# Patient Record
Sex: MALE | Race: WHITE | NOT HISPANIC OR LATINO | Employment: FULL TIME | URBAN - METROPOLITAN AREA
[De-identification: names, ages, dates, MRNs, and addresses within clinical notes are randomized per-mention and may not be internally consistent; named-entity substitution may affect disease eponyms.]

---

## 2021-03-09 ENCOUNTER — IMMUNIZATION (OUTPATIENT)
Dept: VACCINE CLINIC | Facility: HOSPITAL | Age: 21
End: 2021-03-09

## 2021-03-09 PROCEDURE — 0001A: CPT | Performed by: INTERNAL MEDICINE

## 2021-03-09 PROCEDURE — 91300 HC SARSCOV02 VAC 30MCG/0.3ML IM: CPT | Performed by: INTERNAL MEDICINE

## 2021-03-30 ENCOUNTER — IMMUNIZATION (OUTPATIENT)
Dept: VACCINE CLINIC | Facility: HOSPITAL | Age: 21
End: 2021-03-30

## 2021-03-30 PROCEDURE — 91300 HC SARSCOV02 VAC 30MCG/0.3ML IM: CPT | Performed by: INTERNAL MEDICINE

## 2021-03-30 PROCEDURE — 0002A: CPT | Performed by: INTERNAL MEDICINE

## 2021-10-29 ENCOUNTER — OFFICE VISIT (OUTPATIENT)
Dept: FAMILY MEDICINE CLINIC | Facility: CLINIC | Age: 21
End: 2021-10-29

## 2021-10-29 VITALS
OXYGEN SATURATION: 97 % | HEIGHT: 72 IN | RESPIRATION RATE: 20 BRPM | WEIGHT: 258 LBS | HEART RATE: 86 BPM | BODY MASS INDEX: 34.95 KG/M2 | TEMPERATURE: 99.4 F | DIASTOLIC BLOOD PRESSURE: 64 MMHG | SYSTOLIC BLOOD PRESSURE: 106 MMHG

## 2021-10-29 DIAGNOSIS — R10.11 COLICKY RUQ ABDOMINAL PAIN: Primary | ICD-10-CM

## 2021-10-29 DIAGNOSIS — K21.9 GASTROESOPHAGEAL REFLUX DISEASE, UNSPECIFIED WHETHER ESOPHAGITIS PRESENT: ICD-10-CM

## 2021-10-29 DIAGNOSIS — Z23 NEED FOR INFLUENZA VACCINATION: ICD-10-CM

## 2021-10-29 PROCEDURE — 90471 IMMUNIZATION ADMIN: CPT | Performed by: NURSE PRACTITIONER

## 2021-10-29 PROCEDURE — 90686 IIV4 VACC NO PRSV 0.5 ML IM: CPT | Performed by: NURSE PRACTITIONER

## 2021-10-29 PROCEDURE — 99203 OFFICE O/P NEW LOW 30 MIN: CPT | Performed by: NURSE PRACTITIONER

## 2021-10-29 RX ORDER — PANTOPRAZOLE SODIUM 40 MG/1
40 TABLET, DELAYED RELEASE ORAL DAILY
Qty: 30 TABLET | Refills: 0 | Status: SHIPPED | OUTPATIENT
Start: 2021-10-29

## 2021-10-29 NOTE — PROGRESS NOTES
"Chief Complaint  Establish Care (Hasn't had PCP ever, always went to [facility] etc ), Vomiting bile after supper (x 2-3wks), and Flu Vaccine    Subjective          Moses Hairston presents to [facility]  History of Present Illness  NP to establish care  Nauseated and vomiting off and on for the past 3 weeks  Still has gallbladder.  Vomiting bile after eating in the evening, some belching and burping and RUQ abdominal discomfort   Taking a lot of TUMs to help with stomach. Working a lot of over time, under some stress.   Fatty or fried or heavy foods make symptoms worse  Using tobacco dip and cigarettes, alcohol does not seem to make it worse.  Working [facility] [employer] day shift 6 am- 4:30 pm     Objective   Vital Signs:   /64   Pulse 86   Temp 99.4 °F (37.4 °C) Comment: wearing a hat  Resp 20   Ht 182.9 cm (72\")   Wt 117 kg (258 lb)   SpO2 97%   BMI 34.99 kg/m²     Physical Exam  Vitals and nursing note reviewed.   Constitutional:       General: He is not in acute distress.     Appearance: Normal appearance. He is well-developed. He is not diaphoretic.   HENT:      Head: Normocephalic.      Nose: Nose normal.   Cardiovascular:      Rate and Rhythm: Normal rate and regular rhythm.      Heart sounds: Normal heart sounds.   Pulmonary:      Effort: Pulmonary effort is normal.      Breath sounds: Normal breath sounds.   Abdominal:      General: Bowel sounds are normal. There is no distension.      Palpations: Abdomen is soft. There is no mass.      Tenderness: There is no abdominal tenderness. There is no guarding or rebound.      Hernia: No hernia is present.   Skin:     General: Skin is warm and dry.   Neurological:      General: No focal deficit present.      Mental Status: He is alert and oriented to person, place, and time.   Psychiatric:         Behavior: Behavior normal.         Thought Content: Thought content normal.         Judgment: Judgment normal.        Result " Review :                 Assessment and Plan    Diagnoses and all orders for this visit:    1. Colicky RUQ abdominal pain (Primary)  -     US Gallbladder; Future    2. Gastroesophageal reflux disease, unspecified whether esophagitis present  -     pantoprazole (PROTONIX) 40 MG EC tablet; Take 1 tablet by mouth Daily.  Dispense: 30 tablet; Refill: 0  -     US Gallbladder; Future    3. Need for influenza vaccination  -     FluLaval/Fluarix/Fluzone >6 Months ([id number])        Follow Up   No follow-ups on file.  Patient was given instructions and counseling regarding his condition or for health maintenance advice. Please see specific information pulled into the AVS if appropriate.   Will start pt on Pantoprazole and check US of gall bladder if no stones or inflammation will send for HIDA scan to check gallbladder function.   Discussed options with pt. He is okay to proceed.   Advised to go to ER if pain persists and unable to get into office. Pt agrees

## 2021-11-26 ENCOUNTER — HOSPITAL ENCOUNTER (OUTPATIENT)
Dept: ULTRASOUND IMAGING | Facility: HOSPITAL | Age: 21
Discharge: HOME | End: 2021-11-26
Admitting: NURSE PRACTITIONER

## 2021-11-26 DIAGNOSIS — K21.9 GASTROESOPHAGEAL REFLUX DISEASE, UNSPECIFIED WHETHER ESOPHAGITIS PRESENT: ICD-10-CM

## 2021-11-26 DIAGNOSIS — R10.11 COLICKY RUQ ABDOMINAL PAIN: ICD-10-CM

## 2021-11-26 PROCEDURE — 76705 ECHO EXAM OF ABDOMEN: CPT

## 2021-11-28 DIAGNOSIS — K21.9 GASTROESOPHAGEAL REFLUX DISEASE, UNSPECIFIED WHETHER ESOPHAGITIS PRESENT: ICD-10-CM

## 2021-11-28 DIAGNOSIS — R10.11 COLICKY RUQ ABDOMINAL PAIN: Primary | ICD-10-CM

## 2021-12-03 ENCOUNTER — HOSPITAL ENCOUNTER (OUTPATIENT)
Dept: NUCLEAR MEDICINE | Facility: HOSPITAL | Age: 21
Discharge: HOME | End: 2021-12-03

## 2021-12-03 DIAGNOSIS — K21.9 GASTROESOPHAGEAL REFLUX DISEASE, UNSPECIFIED WHETHER ESOPHAGITIS PRESENT: Primary | ICD-10-CM

## 2021-12-03 DIAGNOSIS — R10.11 COLICKY RUQ ABDOMINAL PAIN: ICD-10-CM

## 2021-12-03 DIAGNOSIS — R11.2 NON-INTRACTABLE VOMITING WITH NAUSEA, UNSPECIFIED VOMITING TYPE: ICD-10-CM

## 2021-12-03 DIAGNOSIS — K21.9 GASTROESOPHAGEAL REFLUX DISEASE, UNSPECIFIED WHETHER ESOPHAGITIS PRESENT: ICD-10-CM

## 2021-12-03 PROCEDURE — A9537 TC99M MEBROFENIN: HCPCS | Performed by: NURSE PRACTITIONER

## 2021-12-03 PROCEDURE — 0 TECHNETIUM TC 99M MEBROFENIN KIT: Performed by: NURSE PRACTITIONER

## 2021-12-03 PROCEDURE — 78227 HEPATOBIL SYST IMAGE W/DRUG: CPT

## 2021-12-03 RX ORDER — KIT FOR THE PREPARATION OF TECHNETIUM TC 99M MEBROFENIN 45 MG/10ML
1 INJECTION, POWDER, LYOPHILIZED, FOR SOLUTION INTRAVENOUS
Status: COMPLETED | OUTPATIENT
Start: 2021-12-03 | End: 2021-12-03

## 2021-12-03 RX ADMIN — MEBROFENIN 1 DOSE: 45 INJECTION, POWDER, LYOPHILIZED, FOR SOLUTION INTRAVENOUS at 07:25

## 2021-12-27 ENCOUNTER — OUTSIDE FACILITY SERVICE (OUTPATIENT)
Dept: GASTROENTEROLOGY | Facility: CLINIC | Age: 21
End: 2021-12-27

## 2021-12-27 PROCEDURE — 43239 EGD BIOPSY SINGLE/MULTIPLE: CPT | Performed by: INTERNAL MEDICINE

## 2021-12-27 PROCEDURE — 88305 TISSUE EXAM BY PATHOLOGIST: CPT | Performed by: INTERNAL MEDICINE

## 2021-12-27 RX ORDER — PANTOPRAZOLE SODIUM 40 MG/1
40 TABLET, DELAYED RELEASE ORAL DAILY
Qty: 90 TABLET | Refills: 3 | Status: SHIPPED | OUTPATIENT
Start: 2021-12-27 | End: 2022-02-18

## 2021-12-28 ENCOUNTER — LAB REQUISITION (OUTPATIENT)
Dept: LAB | Facility: HOSPITAL | Age: 21
End: 2021-12-28

## 2021-12-28 DIAGNOSIS — R11.2 NAUSEA WITH VOMITING, UNSPECIFIED: ICD-10-CM

## 2021-12-28 DIAGNOSIS — K21.00 GASTRO-ESOPHAGEAL REFLUX DISEASE WITH ESOPHAGITIS, WITHOUT BLEEDING: ICD-10-CM

## 2021-12-28 DIAGNOSIS — R10.11 RIGHT UPPER QUADRANT PAIN: ICD-10-CM

## 2021-12-28 DIAGNOSIS — K31.89 OTHER DISEASES OF STOMACH AND DUODENUM: ICD-10-CM

## 2021-12-29 LAB
CYTO UR: NORMAL
LAB AP CASE REPORT: NORMAL
LAB AP CLINICAL INFORMATION: NORMAL
PATH REPORT.FINAL DX SPEC: NORMAL
PATH REPORT.GROSS SPEC: NORMAL

## 2022-02-18 ENCOUNTER — OFFICE VISIT (OUTPATIENT)
Dept: FAMILY MEDICINE CLINIC | Facility: CLINIC | Age: 22
End: 2022-02-18

## 2022-02-18 VITALS
DIASTOLIC BLOOD PRESSURE: 80 MMHG | HEIGHT: 72 IN | HEART RATE: 88 BPM | BODY MASS INDEX: 34.92 KG/M2 | WEIGHT: 257.8 LBS | RESPIRATION RATE: 20 BRPM | TEMPERATURE: 99.6 F | SYSTOLIC BLOOD PRESSURE: 160 MMHG

## 2022-02-18 DIAGNOSIS — N52.9 ERECTILE DYSFUNCTION, UNSPECIFIED ERECTILE DYSFUNCTION TYPE: Primary | ICD-10-CM

## 2022-02-18 PROCEDURE — 99213 OFFICE O/P EST LOW 20 MIN: CPT | Performed by: FAMILY MEDICINE

## 2022-02-18 RX ORDER — SILDENAFIL 100 MG/1
100 TABLET, FILM COATED ORAL DAILY PRN
Qty: 30 TABLET | Refills: 2 | Status: SHIPPED | OUTPATIENT
Start: 2022-02-18

## 2022-02-18 NOTE — PROGRESS NOTES
Subjective   Ashok Hairston is a 21 y.o. male.     History of Present Illness     Having some issues with ED the past month or so  New partner he is with and so this is concerning  They have tried 3 times and only had one success    Never happened before    Review of Systems   Constitutional: Negative.        Objective   Physical Exam  Vitals and nursing note reviewed.   Constitutional:       General: He is not in acute distress.     Appearance: Normal appearance. He is well-developed.   Cardiovascular:      Rate and Rhythm: Normal rate and regular rhythm.      Heart sounds: Normal heart sounds.   Pulmonary:      Effort: Pulmonary effort is normal.      Breath sounds: Normal breath sounds.   Neurological:      Mental Status: He is alert and oriented to person, place, and time.   Psychiatric:         Mood and Affect: Mood normal.         Behavior: Behavior normal.         Thought Content: Thought content normal.         Judgment: Judgment normal.         Assessment/Plan   Diagnoses and all orders for this visit:    1. Erectile dysfunction, unspecified erectile dysfunction type (Primary)  -     sildenafil (Viagra) 100 MG tablet; Take 1 tablet by mouth Daily As Needed for Erectile Dysfunction.  Dispense: 30 tablet; Refill: 2    discussed etiology of ED, will try viagra and he will call back INB.  Consider cialis.  Discussed lab work but pt declined at this time.    BP elevated, I think this is stress, will recheck in future.  Has been normal in the past.

## 2022-09-20 ENCOUNTER — OFFICE VISIT (OUTPATIENT)
Dept: FAMILY MEDICINE CLINIC | Facility: CLINIC | Age: 22
End: 2022-09-20

## 2022-09-20 VITALS
BODY MASS INDEX: 34.81 KG/M2 | HEART RATE: 84 BPM | TEMPERATURE: 98.4 F | SYSTOLIC BLOOD PRESSURE: 118 MMHG | HEIGHT: 72 IN | DIASTOLIC BLOOD PRESSURE: 74 MMHG | WEIGHT: 257 LBS

## 2022-09-20 DIAGNOSIS — S30.1XXD CONTUSION OF ABDOMINAL WALL, SUBSEQUENT ENCOUNTER: Primary | ICD-10-CM

## 2022-09-20 PROCEDURE — 99213 OFFICE O/P EST LOW 20 MIN: CPT | Performed by: FAMILY MEDICINE

## 2022-09-20 RX ORDER — CYCLOBENZAPRINE HCL 10 MG
TABLET ORAL
Qty: 30 TABLET | Refills: 0 | Status: SHIPPED | OUTPATIENT
Start: 2022-09-20

## 2022-09-20 RX ORDER — NAPROXEN 500 MG/1
500 TABLET ORAL 2 TIMES DAILY WITH MEALS
Qty: 40 TABLET | Refills: 0 | Status: SHIPPED | OUTPATIENT
Start: 2022-09-20

## 2022-09-20 NOTE — PROGRESS NOTES
Subjective   Ashok Hairston is a 21 y.o. male.     History of Present Illness     He was trying to close the ramp on a [employer]  He was trying to close it vigorously and then it stopped on him  Hit him below his waist and had bruising at his waist line and down into his scrotum  He was seen at [facility] but they told him to come here  No testicular soreness but sore where he hit the ramp hit him    Still with soreness with certain movements      The following portions of the patient's history were reviewed and updated as appropriate: allergies, current medications, past family history, past medical history, past social history, past surgical history and problem list.    Review of Systems   Constitutional: Negative.    Gastrointestinal: Positive for abdominal pain.       Objective   Physical Exam  Vitals and nursing note reviewed.   Constitutional:       General: He is not in acute distress.     Appearance: Normal appearance. He is well-developed.   Cardiovascular:      Rate and Rhythm: Normal rate and regular rhythm.      Heart sounds: Normal heart sounds.   Pulmonary:      Effort: Pulmonary effort is normal.      Breath sounds: Normal breath sounds.   Abdominal:       Neurological:      Mental Status: He is alert and oriented to person, place, and time.   Psychiatric:         Mood and Affect: Mood normal.         Behavior: Behavior normal.         Thought Content: Thought content normal.         Judgment: Judgment normal.         Assessment & Plan   Diagnoses and all orders for this visit:    1. Contusion of abdominal wall, subsequent encounter (Primary)  -     naproxen (Naprosyn) 500 MG tablet; Take 1 tablet by mouth 2 (Two) Times a Day With Meals.  Dispense: 40 tablet; Refill: 0  -     cyclobenzaprine (FLEXERIL) 10 MG tablet; 1 PO QHS  Dispense: 30 tablet; Refill: 0    no indication for imaging.  NSAID and uscle relaxer QHS    Work note 9/19-9/23 with RTW on 9/26/22

## 2024-01-02 ENCOUNTER — OFFICE VISIT (OUTPATIENT)
Dept: FAMILY MEDICINE CLINIC | Facility: CLINIC | Age: 24
End: 2024-01-02
Payer: COMMERCIAL

## 2024-01-02 VITALS
HEIGHT: 72 IN | DIASTOLIC BLOOD PRESSURE: 84 MMHG | RESPIRATION RATE: 16 BRPM | HEART RATE: 85 BPM | BODY MASS INDEX: 32.78 KG/M2 | WEIGHT: 242 LBS | TEMPERATURE: 97.8 F | OXYGEN SATURATION: 98 % | SYSTOLIC BLOOD PRESSURE: 130 MMHG

## 2024-01-02 DIAGNOSIS — N46.9 INFERTILITY MALE: Primary | ICD-10-CM

## 2024-01-02 DIAGNOSIS — N52.9 ERECTILE DYSFUNCTION, UNSPECIFIED ERECTILE DYSFUNCTION TYPE: ICD-10-CM

## 2024-01-02 RX ORDER — SILDENAFIL 100 MG/1
100 TABLET, FILM COATED ORAL DAILY PRN
Qty: 30 TABLET | Refills: 2 | Status: SHIPPED | OUTPATIENT
Start: 2024-01-02

## 2024-01-02 NOTE — PROGRESS NOTES
Subjective   Ashok Hairston is a 23 y.o. male.     History of Present Illness       Ashok Hairston 23 y.o. male who presents for yearly preventive exam.  His overall health is: good  He exercises gymn 2-3 times per week.  He does not see his dentist regularly  His diet is  eating less  He describes his alcohol intake as  1 drink a day  He knows what his cholesterol is No  He is sexually active  Does patient smoke No       He has been having unprotected ex with his wife the past 6 months since they were  and they have not conceived yet.  Pt is concerned about this    Has used viagra on occasion      The following portions of the patient's history were reviewed and updated as appropriate: allergies, current medications, past family history, past medical history, past social history, past surgical history, and problem list.    Review of Systems   Constitutional: Negative.    Respiratory: Negative.     Cardiovascular: Negative.    Psychiatric/Behavioral: Negative.         Objective   Physical Exam  Vitals and nursing note reviewed.   Constitutional:       General: He is not in acute distress.     Appearance: Normal appearance. He is well-developed.   Cardiovascular:      Rate and Rhythm: Normal rate and regular rhythm.      Heart sounds: Normal heart sounds.   Pulmonary:      Effort: Pulmonary effort is normal.      Breath sounds: Normal breath sounds.   Abdominal:      General: Abdomen is flat. Bowel sounds are normal. There is no distension.      Palpations: Abdomen is soft.      Tenderness: There is no abdominal tenderness. There is no guarding.   Neurological:      Mental Status: He is alert and oriented to person, place, and time.   Psychiatric:         Mood and Affect: Mood normal.         Behavior: Behavior normal.         Thought Content: Thought content normal.         Judgment: Judgment normal.         Assessment & Plan   Diagnoses and all orders for this visit:    1. Infertility  male (Primary)    2. Erectile dysfunction, unspecified erectile dysfunction type  -     sildenafil (Viagra) 100 MG tablet; Take 1 tablet by mouth Daily As Needed for Erectile Dysfunction.  Dispense: 30 tablet; Refill: 2    Counseled pt about well adult care including diet and exercise with BMI 32    I did order sperm analyses and will f/u -pending tests.  Discussed infertility really only after 1 year of trying to conceive.    Ok PRN viagra, not needed nor used very often

## 2024-01-04 ENCOUNTER — TELEPHONE (OUTPATIENT)
Dept: FAMILY MEDICINE CLINIC | Facility: CLINIC | Age: 24
End: 2024-01-04
Payer: COMMERCIAL

## 2024-11-21 ENCOUNTER — OFFICE VISIT (OUTPATIENT)
Dept: FAMILY MEDICINE CLINIC | Facility: CLINIC | Age: 24
End: 2024-11-21
Payer: COMMERCIAL

## 2024-11-21 VITALS
DIASTOLIC BLOOD PRESSURE: 84 MMHG | SYSTOLIC BLOOD PRESSURE: 124 MMHG | BODY MASS INDEX: 36.84 KG/M2 | RESPIRATION RATE: 12 BRPM | TEMPERATURE: 98 F | WEIGHT: 272 LBS | OXYGEN SATURATION: 98 % | HEART RATE: 84 BPM | HEIGHT: 72 IN

## 2024-11-21 DIAGNOSIS — Z13.220 ENCOUNTER FOR LIPID SCREENING FOR CARDIOVASCULAR DISEASE: ICD-10-CM

## 2024-11-21 DIAGNOSIS — R11.2 NAUSEA AND VOMITING, UNSPECIFIED VOMITING TYPE: ICD-10-CM

## 2024-11-21 DIAGNOSIS — R42 DIZZINESS: ICD-10-CM

## 2024-11-21 DIAGNOSIS — N52.9 ERECTILE DYSFUNCTION, UNSPECIFIED ERECTILE DYSFUNCTION TYPE: ICD-10-CM

## 2024-11-21 DIAGNOSIS — Z00.00 ENCOUNTER FOR WELL ADULT EXAM WITHOUT ABNORMAL FINDINGS: Primary | ICD-10-CM

## 2024-11-21 DIAGNOSIS — Z13.6 ENCOUNTER FOR LIPID SCREENING FOR CARDIOVASCULAR DISEASE: ICD-10-CM

## 2024-11-21 DIAGNOSIS — Z11.59 NEED FOR HEPATITIS C SCREENING TEST: ICD-10-CM

## 2024-11-21 DIAGNOSIS — E55.9 VITAMIN D DEFICIENCY: ICD-10-CM

## 2024-11-21 PROCEDURE — 99395 PREV VISIT EST AGE 18-39: CPT | Performed by: PHYSICIAN ASSISTANT

## 2024-11-21 RX ORDER — ESCITALOPRAM OXALATE 20 MG/1
TABLET ORAL
COMMUNITY
Start: 2024-10-28

## 2024-11-21 RX ORDER — TADALAFIL 20 MG/1
20 TABLET ORAL DAILY PRN
Qty: 30 TABLET | Refills: 5 | Status: SHIPPED | OUTPATIENT
Start: 2024-11-21

## 2024-11-21 RX ORDER — BUPROPION HYDROCHLORIDE 150 MG/1
TABLET ORAL
COMMUNITY
Start: 2024-11-12

## 2024-11-21 NOTE — PROGRESS NOTES
"Subjective   Ashok Hairston is a 23 y.o. male.     History of Present Illness   History of Present Illness  The patient presents for annual physical  for return to work evaluation     He experienced an episode of vomiting at work yesterday, which was preceded by a feeling of nausea. This occurred approximately 45 minutes after consuming a ham and cheese sandwich and a bag of Doritos for breakfast. He also reported cold sweats and a single episode of vomiting. He felt slightly dizzy but did not lose consciousness. His stomach felt uneasy for the remainder of the day, but he woke up feeling fine today. His bowel movements have been normal.    He has been diagnosed with Postural Orthostatic Tachycardia Syndrome (POTS) since the age of 14. He has not undergone a tilt table test, but his heart rate has been monitored several times. He recalls an instance when his heart rate increased to 180 upon standing. The last time he fainted was in 2020 while working at [employer]. He fell and hit his head on a truck. He has not experienced any vision changes.    He is currently on Lexapro and Wellbutrin, which he reports are helping him return to his normal functions. He has been taking Lexapro for 1.5 months and Wellbutrin for 2 weeks.    He has been prescribed sildenafil 100 mg but reports that it is not effective.    He has not had any recent blood work or physical examinations.    FAMILY HISTORY  His wife has PCOS and is on metformin.       The following portions of the patient's history were reviewed and updated as appropriate: allergies, current medications, past family history, past medical history, past social history, past surgical history, and problem list.    Review of Systems  As noted per HPI     Objective   Blood pressure 124/84, pulse 84, temperature 98 °F (36.7 °C), resp. rate 12, height 182.9 cm (72\"), weight 123 kg (272 lb), SpO2 98%. Body mass index is 36.89 kg/m².   Physical Exam  Vitals reviewed. "   Constitutional:       Appearance: Normal appearance.   HENT:      Right Ear: Tympanic membrane, ear canal and external ear normal.      Left Ear: Tympanic membrane, ear canal and external ear normal.   Cardiovascular:      Rate and Rhythm: Normal rate and regular rhythm.   Pulmonary:      Effort: Pulmonary effort is normal.      Breath sounds: Normal breath sounds.   Abdominal:      General: Bowel sounds are normal.      Tenderness: There is no abdominal tenderness. There is no guarding.   Skin:     General: Skin is warm and dry.   Neurological:      Mental Status: He is alert and oriented to person, place, and time.   Psychiatric:         Mood and Affect: Mood normal.         Behavior: Behavior normal.         Results      Assessment & Plan   Assessment & Plan  1. Nausea and vomiting.  He experienced a one-time episode of nausea and vomiting at work, which resolved without further incidents. He is feeling fine now with no current symptoms. Fasting blood work has been ordered to assess blood sugar, thyroid, and cholesterol levels. He is advised to fast for 8 hours prior to the test.    2. Suspected postural orthostatic tachycardia syndrome (POTS).  He has a history of symptoms suggestive of POTS, including episodes of dizziness and passing out when standing up. The last episode occurred in 2020. A tilt table test was not previously performed, so POTS remains a suspected diagnosis. Further evaluation may be considered if symptoms persist or worsen.    3. Erectile dysfunction.  He reports that sildenafil is not effective. Cialis has been prescribed as an alternative. Additionally, a testosterone level check has been ordered to rule out any underlying hormonal issues. He is advised to fast for 8 hours before the blood draw.    4. Medication management.  He is currently on Lexapro and Wellbutrin. He reports sexual dysfunction, which may be related to Lexapro. He is advised to discuss with his prescriber the  possibility of switching to Prozac, which may have fewer sexual side effects.       Diagnoses and all orders for this visit:    1. Encounter for well adult exam without abnormal findings (Primary)  -     CBC w AUTO Differential  -     Comprehensive metabolic panel  -     Lipid Panel  -     Testosterone  -     Iron Profile  -     Vitamin B12  -     Folate  -     Vitamin D 25 hydroxy  -     Hemoglobin A1c  -     Hepatitis C antibody    2. Encounter for lipid screening for cardiovascular disease  -     Lipid Panel    3. Erectile dysfunction, unspecified erectile dysfunction type  -     Testosterone  -     tadalafil (Cialis) 20 MG tablet; Take 1 tablet by mouth Daily As Needed for Erectile Dysfunction.  Dispense: 30 tablet; Refill: 5    4. Dizziness  -     CBC w AUTO Differential  -     Comprehensive metabolic panel  -     Iron Profile  -     Vitamin B12  -     Folate  -     Hemoglobin A1c    5. Vitamin D deficiency  -     Vitamin D 25 hydroxy    6. Need for hepatitis C screening test  -     Hepatitis C antibody    7. Nausea and vomiting, unspecified vomiting type  -     CBC w AUTO Differential  -     Comprehensive metabolic panel      Counseling was given to patient for the following topics: instructions for management, risk factor reductions, patient and family education, and impressions . Total time of the encounter was 15 minutes and 7.5 minutes was spent counseling.           Patient or patient representative verbalized consent for the use of Ambient Listening during the visit with  MELVINA Ramos for chart documentation. 12/6/2024  11:52 EST

## 2024-11-22 ENCOUNTER — TELEPHONE (OUTPATIENT)
Dept: FAMILY MEDICINE CLINIC | Facility: CLINIC | Age: 24
End: 2024-11-22
Payer: COMMERCIAL

## 2025-04-14 ENCOUNTER — OFFICE VISIT (OUTPATIENT)
Dept: FAMILY MEDICINE CLINIC | Facility: CLINIC | Age: 25
End: 2025-04-14
Payer: COMMERCIAL

## 2025-04-14 VITALS
BODY MASS INDEX: 34.54 KG/M2 | TEMPERATURE: 97.1 F | RESPIRATION RATE: 16 BRPM | DIASTOLIC BLOOD PRESSURE: 70 MMHG | SYSTOLIC BLOOD PRESSURE: 116 MMHG | HEART RATE: 95 BPM | HEIGHT: 72 IN | OXYGEN SATURATION: 98 % | WEIGHT: 255 LBS

## 2025-04-14 DIAGNOSIS — Z87.19 HISTORY OF ESOPHAGEAL DISORDER: ICD-10-CM

## 2025-04-14 DIAGNOSIS — R11.2 NAUSEA AND VOMITING, UNSPECIFIED VOMITING TYPE: ICD-10-CM

## 2025-04-14 DIAGNOSIS — K21.9 GASTROESOPHAGEAL REFLUX DISEASE WITHOUT ESOPHAGITIS: Primary | ICD-10-CM

## 2025-04-14 DIAGNOSIS — N52.9 ERECTILE DYSFUNCTION, UNSPECIFIED ERECTILE DYSFUNCTION TYPE: ICD-10-CM

## 2025-04-14 PROCEDURE — 99214 OFFICE O/P EST MOD 30 MIN: CPT | Performed by: NURSE PRACTITIONER

## 2025-04-14 RX ORDER — SUCRALFATE 1 G/1
1 TABLET ORAL 4 TIMES DAILY
Qty: 120 TABLET | Refills: 0 | Status: SHIPPED | OUTPATIENT
Start: 2025-04-14

## 2025-04-14 RX ORDER — DESVENLAFAXINE 50 MG/1
50 TABLET, FILM COATED, EXTENDED RELEASE ORAL DAILY
COMMUNITY
Start: 2025-04-08

## 2025-04-14 RX ORDER — PROPRANOLOL HCL 20 MG
20 TABLET ORAL AS NEEDED
COMMUNITY
Start: 2025-04-08

## 2025-04-14 RX ORDER — OMEPRAZOLE 40 MG/1
40 CAPSULE, DELAYED RELEASE ORAL DAILY
Qty: 30 CAPSULE | Refills: 1 | Status: SHIPPED | OUTPATIENT
Start: 2025-04-14

## 2025-04-14 RX ORDER — TADALAFIL 20 MG/1
20 TABLET ORAL DAILY PRN
Qty: 30 TABLET | Refills: 5 | Status: SHIPPED | OUTPATIENT
Start: 2025-04-14

## 2025-04-14 NOTE — PROGRESS NOTES
Date: 2025   Patient Name: Ashok Hairston  : 2000   MRN: [id number]     Chief Complaint:    Chief Complaint   Patient presents with    Vomiting Blood     About a quarter size amount in his vomit 4 times today       History of Present Illness: Ashok Hairston is a 24 y.o. male who is here today to follow up for HPI     History of Present Illness  The patient presents for evaluation of vomiting.    He reports an episode of vomiting at approximately 5:30 AM today, during which he noticed the presence of blood in his mouth. The blood was predominantly dark with occasional bright red specks. He has experienced 4 subsequent episodes of vomiting, the most recent occurring at 2:30 PM. He continues to experience mild nausea. His last meal, consumed the previous night, was a sandwich with ketchup. He reports no recent contact with sick individuals. He also reports no episodes of diarrhea. He attempted to alleviate his symptoms with Tums, taking 4 to 5 tablets, which provided minimal relief. He has a past medical history of an esophageal tear.    MEDICATIONS  Current: Tums     Review of Systems:   Review of Systems   Gastrointestinal:  Positive for abdominal pain, nausea and vomiting.       I have reviewed the patients family history, social history, past medical history, past surgical history and have updated it as appropriate.     Medications:     Current Outpatient Medications:     desvenlafaxine (PRISTIQ) 50 MG 24 hr tablet, Take 1 tablet by mouth Daily., Disp: , Rfl:     escitalopram (LEXAPRO) 20 MG tablet, , Disp: , Rfl:     propranolol (INDERAL) 20 MG tablet, Take 1 tablet by mouth As Needed., Disp: , Rfl:     tadalafil (Cialis) 20 MG tablet, Take 1 tablet by mouth Daily As Needed for Erectile Dysfunction., Disp: 30 tablet, Rfl: 5    omeprazole (priLOSEC) 40 MG capsule, Take 1 capsule by mouth Daily., Disp: 30 capsule, Rfl: 1    sucralfate (CARAFATE) 1 g tablet, Take 1 tablet by  "mouth 4 (Four) Times a Day., Disp: 120 tablet, Rfl: 0    Allergies:   Allergies   Allergen Reactions    Cephalosporins Rash    Penicillins Rash       PHQ-9 Total Score:      Physical Exam:  Vital Signs:   Vitals:    04/14/25 1534   BP: 116/70   Pulse: 95   Resp: 16   Temp: 97.1 °F (36.2 °C)   SpO2: 98%   Weight: 116 kg (255 lb)   Height: 182.9 cm (72\")     Body mass index is 34.58 kg/m².   BMI is >= 30 and <35. (Class 1 Obesity). The following options were offered after discussion;: weight loss educational material (shared in after visit summary)       Physical Exam  Vitals and nursing note reviewed.   Constitutional:       Appearance: Normal appearance.   HENT:      Head: Normocephalic and atraumatic.   Cardiovascular:      Rate and Rhythm: Normal rate and regular rhythm.   Pulmonary:      Effort: Pulmonary effort is normal.      Breath sounds: Normal breath sounds.   Abdominal:      General: Abdomen is flat. Bowel sounds are normal.      Palpations: Abdomen is soft.   Neurological:      Mental Status: He is alert and oriented to person, place, and time.           Assessment/Plan:   Diagnoses and all orders for this visit:    1. Gastroesophageal reflux disease without esophagitis (Primary)  -     omeprazole (priLOSEC) 40 MG capsule; Take 1 capsule by mouth Daily.  Dispense: 30 capsule; Refill: 1  -     sucralfate (CARAFATE) 1 g tablet; Take 1 tablet by mouth 4 (Four) Times a Day.  Dispense: 120 tablet; Refill: 0  -     Ambulatory Referral to Gastroenterology    2. Nausea and vomiting, unspecified vomiting type  -     omeprazole (priLOSEC) 40 MG capsule; Take 1 capsule by mouth Daily.  Dispense: 30 capsule; Refill: 1  -     sucralfate (CARAFATE) 1 g tablet; Take 1 tablet by mouth 4 (Four) Times a Day.  Dispense: 120 tablet; Refill: 0  -     Ambulatory Referral to Gastroenterology    3. History of esophageal disorder  -     omeprazole (priLOSEC) 40 MG capsule; Take 1 capsule by mouth Daily.  Dispense: 30 capsule; " Refill: 1  -     sucralfate (CARAFATE) 1 g tablet; Take 1 tablet by mouth 4 (Four) Times a Day.  Dispense: 120 tablet; Refill: 0  -     Ambulatory Referral to Gastroenterology    4. Erectile dysfunction, unspecified erectile dysfunction type  -     tadalafil (Cialis) 20 MG tablet; Take 1 tablet by mouth Daily As Needed for Erectile Dysfunction.  Dispense: 30 tablet; Refill: 5       Assessment & Plan  1. Vomiting.  The etiology of the vomiting could potentially be attributed to the food consumed the previous night or a gastrointestinal infection. A prescription for omeprazole has been issued, to be taken daily in the morning. Additionally, Carafate has been prescribed, to be administered 4 times daily with a minimum interval of 4 hours between each dose. A referral to a gastroenterology specialist has been initiated for further evaluation and management. Go to the ER with worsening or severe symptoms.     Patient or patient representative verbalized consent for the use of Ambient Listening during the visit with  MARKELL Lopez for chart documentation. 4/14/2025  15:47 EDT    Follow Up:   Return if symptoms worsen or fail to improve.      Anika Jimenez. MARKELL   [facility]

## 2025-05-10 DIAGNOSIS — K21.9 GASTROESOPHAGEAL REFLUX DISEASE WITHOUT ESOPHAGITIS: ICD-10-CM

## 2025-05-10 DIAGNOSIS — Z87.19 HISTORY OF ESOPHAGEAL DISORDER: ICD-10-CM

## 2025-05-10 DIAGNOSIS — R11.2 NAUSEA AND VOMITING, UNSPECIFIED VOMITING TYPE: ICD-10-CM

## 2025-05-12 RX ORDER — SUCRALFATE 1 G/1
1 TABLET ORAL 4 TIMES DAILY
Qty: 120 TABLET | Refills: 0 | Status: SHIPPED | OUTPATIENT
Start: 2025-05-12